# Patient Record
Sex: FEMALE | URBAN - METROPOLITAN AREA
[De-identification: names, ages, dates, MRNs, and addresses within clinical notes are randomized per-mention and may not be internally consistent; named-entity substitution may affect disease eponyms.]

---

## 2022-04-25 ENCOUNTER — OFFICE VISIT (OUTPATIENT)
Dept: URBAN - METROPOLITAN AREA CLINIC 56 | Facility: CLINIC | Age: 32
End: 2022-04-25
Payer: MEDICAID

## 2022-04-25 DIAGNOSIS — H43.393 OTHER VITREOUS OPACITIES, BILATERAL: Primary | ICD-10-CM

## 2022-04-25 DIAGNOSIS — H53.19 OTHER SUBJECTIVE VISUAL DISTURBANCES: ICD-10-CM

## 2022-04-25 PROCEDURE — 92004 COMPRE OPH EXAM NEW PT 1/>: CPT | Performed by: STUDENT IN AN ORGANIZED HEALTH CARE EDUCATION/TRAINING PROGRAM

## 2022-04-25 PROCEDURE — 92134 CPTRZ OPH DX IMG PST SGM RTA: CPT | Performed by: STUDENT IN AN ORGANIZED HEALTH CARE EDUCATION/TRAINING PROGRAM

## 2022-04-25 ASSESSMENT — INTRAOCULAR PRESSURE
OS: 17
OD: 17

## 2022-04-25 ASSESSMENT — KERATOMETRY
OD: 42.59
OS: 42.27

## 2022-04-25 ASSESSMENT — VISUAL ACUITY
OS: 20/20
OD: 20/20

## 2022-04-25 NOTE — IMPRESSION/PLAN
Impression: Other subjective visual disturbances: H53.19. Plan: worsening glare at night. Discussed uncorrected spec Rx (although minimal) vs large pupil. Gave sample of Alphagan P to use PRN for glare at night. If symptoms improved, will call in Rx. If symptoms persistent, discussed pt to return for MRx for night driving only.

## 2022-04-25 NOTE — IMPRESSION/PLAN
Impression: Other vitreous opacities, bilateral: H43.393. Plan: no holes/tears/detachments. RD precautions discussed. Monitor.